# Patient Record
Sex: MALE | Race: WHITE | NOT HISPANIC OR LATINO | URBAN - METROPOLITAN AREA
[De-identification: names, ages, dates, MRNs, and addresses within clinical notes are randomized per-mention and may not be internally consistent; named-entity substitution may affect disease eponyms.]

---

## 2017-02-03 ENCOUNTER — COMMUNICATION - HEALTHEAST (OUTPATIENT)
Dept: INTERNAL MEDICINE | Facility: CLINIC | Age: 72
End: 2017-02-03

## 2017-06-06 ENCOUNTER — COMMUNICATION - HEALTHEAST (OUTPATIENT)
Dept: INTERNAL MEDICINE | Facility: CLINIC | Age: 72
End: 2017-06-06

## 2017-08-31 ENCOUNTER — COMMUNICATION - HEALTHEAST (OUTPATIENT)
Dept: INTERNAL MEDICINE | Facility: CLINIC | Age: 72
End: 2017-08-31

## 2019-02-18 ENCOUNTER — HOSPITAL ENCOUNTER (OUTPATIENT)
Dept: HOSPITAL 47 - LABWHC1 | Age: 74
End: 2019-02-18
Attending: UROLOGY
Payer: MEDICARE

## 2019-02-18 DIAGNOSIS — R97.20: Primary | ICD-10-CM

## 2019-02-18 PROCEDURE — 36415 COLL VENOUS BLD VENIPUNCTURE: CPT

## 2019-02-18 PROCEDURE — 84153 ASSAY OF PSA TOTAL: CPT

## 2019-02-18 PROCEDURE — 84154 ASSAY OF PSA FREE: CPT

## 2019-02-26 ENCOUNTER — HOSPITAL ENCOUNTER (OUTPATIENT)
Dept: HOSPITAL 47 - RADUSWWP | Age: 74
Discharge: HOME | End: 2019-02-26
Attending: UROLOGY
Payer: MEDICARE

## 2019-02-26 DIAGNOSIS — N20.0: Primary | ICD-10-CM

## 2019-02-26 PROCEDURE — 76770 US EXAM ABDO BACK WALL COMP: CPT

## 2019-02-26 NOTE — US
EXAMINATION TYPE: US kidneys/renal and bladder

 

DATE OF EXAM: 2/26/2019

 

COMPARISON: NONE

 

CLINICAL HISTORY: N20.0 Calculus of Kidney. History of renal stone.

 

EXAM MEASUREMENTS:

 

Right Kidney:  11.4 x 5.4 x 5.6 cm

Left Kidney: 11.8 x 5.8 x 5.6 cm

 

 

 

 

Right Kidney: No hydronephrosis or masses seen  

Left Kidney: small cyst measures 0.9 x 0.7 x 0.8 cm located mid/lateral, and two side by side versus 
one septated cyst located superior that measures 4.4 x 2.9 x 2.4 cm.   

Bladder: wnl

**Bilateral Jets seen: Yes

**Incidental noted is made of liver cyst that measures 3.6 x 3.2 x 3.2 cm.

**Incidental note is made of prominent prostate 

 

Image of the right kidney are grossly unremarkable. Technologist marked 3.6 cm simple appearing thin-
walled cyst in the adjacent liver. Technologist marks a few small simple appearing cysts scattered th
roughout the left kidney including some larger exophytic cyst superiorly. No suspicious solid or cyst
ic mass left kidney is present. Bladder is somewhat poorly distended. Bilateral distal ureter jets ar
e seen. Prostate gland felt prominent per technologist during real-time scanning. Correlate clinicall
y.

 

IMPRESSION:

No hydronephrosis or shadowing renal calculi clearly seen bilaterally.

## 2021-05-30 ENCOUNTER — RECORDS - HEALTHEAST (OUTPATIENT)
Dept: ADMINISTRATIVE | Facility: CLINIC | Age: 76
End: 2021-05-30